# Patient Record
Sex: FEMALE | Race: WHITE
[De-identification: names, ages, dates, MRNs, and addresses within clinical notes are randomized per-mention and may not be internally consistent; named-entity substitution may affect disease eponyms.]

---

## 2017-07-27 ENCOUNTER — HOSPITAL ENCOUNTER (OUTPATIENT)
Dept: HOSPITAL 39 - GMAB | Age: 22
Discharge: HOME | End: 2017-07-27
Attending: FAMILY MEDICINE
Payer: COMMERCIAL

## 2017-07-27 DIAGNOSIS — E73.9: Primary | ICD-10-CM

## 2017-07-27 DIAGNOSIS — R53.82: ICD-10-CM

## 2017-10-23 ENCOUNTER — APPOINTMENT (RX ONLY)
Dept: URBAN - METROPOLITAN AREA CLINIC 47 | Facility: CLINIC | Age: 22
Setting detail: DERMATOLOGY
End: 2017-10-23

## 2017-10-23 DIAGNOSIS — L70.0 ACNE VULGARIS: ICD-10-CM | Status: INADEQUATELY CONTROLLED

## 2017-10-23 DIAGNOSIS — Z79.899 OTHER LONG TERM (CURRENT) DRUG THERAPY: ICD-10-CM

## 2017-10-23 PROCEDURE — ? ISOTRETINOIN INITIATION

## 2017-10-23 PROCEDURE — ? OTHER

## 2017-10-23 PROCEDURE — 99202 OFFICE O/P NEW SF 15 MIN: CPT

## 2017-10-23 PROCEDURE — ? ORDER TESTS

## 2017-10-23 PROCEDURE — ? COUNSELING

## 2017-10-23 PROCEDURE — ? URINE PREGNANCY TEST

## 2017-10-23 PROCEDURE — 81025 URINE PREGNANCY TEST: CPT

## 2017-10-23 ASSESSMENT — SEVERITY ASSESSMENT OVERALL AMONG ALL PATIENTS
IN YOUR EXPERIENCE, AMONG ALL PATIENTS YOU HAVE SEEN WITH THIS CONDITION, HOW SEVERE IS THIS PATIENT'S CONDITION?: MULTIPLE INFLAMMATORY LESIONS BUT NONINFLAMMATORY LESIONS PREDOMINATE

## 2017-10-23 NOTE — PROCEDURE: ORDER TESTS
Bill For Surgical Tray: no
Performing Laboratory: -[id number]
Expected Date Of Service: 10/23/2017
Billing Type: Third-Party Bill

## 2017-10-23 NOTE — PROCEDURE: ISOTRETINOIN INITIATION
Anticipated Starting Dosage (Optional): 40mg Daily
Ipledge Number (Optional): [id number]
Detail Level: Zone
Patient Reported Weight (Optional - Include Units): 110

## 2017-10-23 NOTE — PROCEDURE: OTHER
Other (Free Text): All options were discussed in detail\\nPatient opts for accutane
Note Text (......Xxx Chief Complaint.): This diagnosis correlates with the
Detail Level: Zone

## 2017-10-23 NOTE — PROCEDURE: URINE PREGNANCY TEST
Urine Pregnancy Test Result: negative
Expiration Date (Optional): 2018-08-31
Lot # (Optional): [id number]
Detail Level: None

## 2017-11-24 ENCOUNTER — HOSPITAL ENCOUNTER (OUTPATIENT)
Dept: HOSPITAL 39 - LAB.O | Age: 22
Discharge: HOME | End: 2017-11-24
Payer: COMMERCIAL

## 2017-11-24 DIAGNOSIS — Z79.899: Primary | ICD-10-CM

## 2017-11-27 ENCOUNTER — APPOINTMENT (RX ONLY)
Dept: URBAN - METROPOLITAN AREA CLINIC 47 | Facility: CLINIC | Age: 22
Setting detail: DERMATOLOGY
End: 2017-11-27

## 2017-11-27 DIAGNOSIS — Z79.899 OTHER LONG TERM (CURRENT) DRUG THERAPY: ICD-10-CM

## 2017-11-27 DIAGNOSIS — L70.0 ACNE VULGARIS: ICD-10-CM | Status: INADEQUATELY CONTROLLED

## 2017-11-27 PROCEDURE — 99213 OFFICE O/P EST LOW 20 MIN: CPT

## 2017-11-27 PROCEDURE — ? COUNSELING

## 2017-11-27 PROCEDURE — ? PRESCRIPTION

## 2017-11-27 PROCEDURE — ? ORDER TESTS

## 2017-11-27 PROCEDURE — ? ISOTRETINOIN INITIATION

## 2017-11-27 RX ORDER — ISOTRETINOIN 40 MG/1
CAPSULE, LIQUID FILLED ORAL
Qty: 30 | Refills: 0 | Status: ERX | COMMUNITY
Start: 2017-11-27

## 2017-11-27 RX ADMIN — ISOTRETINOIN: 40 CAPSULE, LIQUID FILLED ORAL at 17:26

## 2017-11-27 NOTE — PROCEDURE: ISOTRETINOIN INITIATION
Ipledge Number (Optional): [id number]
Anticipated Starting Dosage (Optional): 40mg Daily
Detail Level: Zone
Patient Reported Weight (Optional - Include Units): 110

## 2017-11-27 NOTE — PROCEDURE: ORDER TESTS
Billing Type: Third-Party Bill
Performing Laboratory: -[id number]
Bill For Surgical Tray: no
Expected Date Of Service: 10/23/2017

## 2017-12-25 ENCOUNTER — HOSPITAL ENCOUNTER (OUTPATIENT)
Dept: HOSPITAL 39 - LAB.O | Age: 22
Discharge: HOME | End: 2017-12-25
Attending: DERMATOLOGY
Payer: COMMERCIAL

## 2017-12-25 DIAGNOSIS — Z79.899: Primary | ICD-10-CM

## 2017-12-27 ENCOUNTER — APPOINTMENT (RX ONLY)
Dept: URBAN - METROPOLITAN AREA CLINIC 47 | Facility: CLINIC | Age: 22
Setting detail: DERMATOLOGY
End: 2017-12-27

## 2017-12-27 DIAGNOSIS — L70.0 ACNE VULGARIS: ICD-10-CM | Status: INADEQUATELY CONTROLLED

## 2017-12-27 DIAGNOSIS — Z79.899 OTHER LONG TERM (CURRENT) DRUG THERAPY: ICD-10-CM

## 2017-12-27 PROCEDURE — ? COUNSELING

## 2017-12-27 PROCEDURE — 99214 OFFICE O/P EST MOD 30 MIN: CPT

## 2017-12-27 PROCEDURE — ? ORDER TESTS

## 2017-12-27 PROCEDURE — ? PRESCRIPTION

## 2017-12-27 RX ORDER — ISOTRETINOIN 30 MG/1
CAPSULE, LIQUID FILLED ORAL
Qty: 60 | Refills: 0 | Status: ERX | COMMUNITY
Start: 2017-12-27

## 2017-12-27 RX ORDER — TRIAMCINOLONE ACETONIDE 1 MG/G
CREAM TOPICAL
Qty: 1 | Refills: 0 | Status: ERX | COMMUNITY
Start: 2017-12-27

## 2017-12-27 RX ADMIN — ISOTRETINOIN: 30 CAPSULE, LIQUID FILLED ORAL at 15:32

## 2017-12-27 RX ADMIN — TRIAMCINOLONE ACETONIDE: 1 CREAM TOPICAL at 15:52

## 2017-12-27 ASSESSMENT — SEVERITY ASSESSMENT OVERALL AMONG ALL PATIENTS
IN YOUR EXPERIENCE, AMONG ALL PATIENTS YOU HAVE SEEN WITH THIS CONDITION, HOW SEVERE IS THIS PATIENT'S CONDITION?: INFLAMMATORY LESIONS MORE APPARENT; MANY COMEDONES AND PAPULES/PUSTULES, +/- FEW NODULOCYSTIC LESIONS

## 2017-12-27 NOTE — PROCEDURE: ORDER TESTS
Bill For Surgical Tray: no
Performing Laboratory: -[id number]
Billing Type: Third-Party Bill
Expected Date Of Service: 10/23/2017

## 2018-01-22 ENCOUNTER — HOSPITAL ENCOUNTER (OUTPATIENT)
Dept: HOSPITAL 39 - LAB.O | Age: 23
Discharge: HOME | End: 2018-01-22
Attending: DERMATOLOGY
Payer: COMMERCIAL

## 2018-01-22 DIAGNOSIS — Z79.899: Primary | ICD-10-CM

## 2018-01-29 ENCOUNTER — APPOINTMENT (RX ONLY)
Dept: URBAN - METROPOLITAN AREA CLINIC 47 | Facility: CLINIC | Age: 23
Setting detail: DERMATOLOGY
End: 2018-01-29

## 2018-01-29 DIAGNOSIS — Z79.899 OTHER LONG TERM (CURRENT) DRUG THERAPY: ICD-10-CM

## 2018-01-29 DIAGNOSIS — L70.0 ACNE VULGARIS: ICD-10-CM | Status: INADEQUATELY CONTROLLED

## 2018-01-29 PROCEDURE — ? COUNSELING

## 2018-01-29 PROCEDURE — ? ISOTRETINOIN MONITORING

## 2018-01-29 PROCEDURE — ? URINE PREGNANCY TEST

## 2018-01-29 PROCEDURE — ? ORDER TESTS

## 2018-01-29 PROCEDURE — 81025 URINE PREGNANCY TEST: CPT

## 2018-01-29 PROCEDURE — 99214 OFFICE O/P EST MOD 30 MIN: CPT

## 2018-01-29 PROCEDURE — ? PRESCRIPTION

## 2018-01-29 RX ORDER — TRIAMCINOLONE ACETONIDE 1 MG/G
CREAM TOPICAL
Qty: 1 | Refills: 0 | Status: ERX

## 2018-01-29 RX ORDER — ISOTRETINOIN 30 MG/1
CAPSULE, LIQUID FILLED ORAL
Qty: 60 | Refills: 0 | Status: ERX

## 2018-01-29 NOTE — PROCEDURE: URINE PREGNANCY TEST
Lot # (Optional): [id number]
Expiration Date (Optional): 08/31/2018
Detail Level: None
Urine Pregnancy Test Result: negative

## 2018-01-29 NOTE — PROCEDURE: ORDER TESTS
Performing Laboratory: -[id number]
Expected Date Of Service: 10/23/2017
Billing Type: Third-Party Bill
Bill For Surgical Tray: no

## 2018-01-29 NOTE — PROCEDURE: ISOTRETINOIN MONITORING
Patient Weight (Optional But Required For Cumulative Dose-Numbers And Decimals Only): 110
Months Of Therapy Completed: 2
Dosing Month 2 (Required For Cumulative Dosing): 30mg BID
Are Labs Available For Review?: Yes
Dosing Month 1 (Required For Cumulative Dosing): 40mg Daily
Female Completion Statement: After discussing her treatment course we decided to discontinue isotretinoin therapy at this time. I explained that she would need to continue her birth control methods for at least one month after the last dosage. She should also get a pregnancy test one month after the last dose. She shouldn't donate blood for one month after the last dose. She should call with any new symptoms of depression.
Completed Therapy?: No
Weight Units: pounds
Pounds Preamble Statement (Weight Entered In Details Tab): Reported Weight in pounds:
Kilograms Preamble Statement (Weight Entered In Details Tab): Reported Weight in kilograms:
Ipledge Number (Optional): [id number]
Detail Level: Zone
Male Completion Statement: After discussing his treatment course we decided to discontinue isotretinoin therapy at this time. He shouldn't donate blood for one month after the last dose. He should call with any new symptoms of depression.

## 2018-02-26 ENCOUNTER — HOSPITAL ENCOUNTER (OUTPATIENT)
Dept: HOSPITAL 39 - LAB.O | Age: 23
End: 2018-02-26
Attending: DERMATOLOGY
Payer: COMMERCIAL

## 2018-02-26 DIAGNOSIS — Z79.899: Primary | ICD-10-CM

## 2018-02-27 ENCOUNTER — HOSPITAL ENCOUNTER (OUTPATIENT)
Dept: HOSPITAL 39 - GMAB | Age: 23
End: 2018-02-27
Attending: FAMILY MEDICINE
Payer: COMMERCIAL

## 2018-02-27 DIAGNOSIS — M54.5: Primary | ICD-10-CM

## 2018-02-28 ENCOUNTER — RX ONLY (OUTPATIENT)
Age: 23
Setting detail: RX ONLY
End: 2018-02-28

## 2018-02-28 ENCOUNTER — APPOINTMENT (RX ONLY)
Dept: URBAN - METROPOLITAN AREA CLINIC 47 | Facility: CLINIC | Age: 23
Setting detail: DERMATOLOGY
End: 2018-02-28

## 2018-02-28 DIAGNOSIS — Z79.899 OTHER LONG TERM (CURRENT) DRUG THERAPY: ICD-10-CM | Status: STABLE

## 2018-02-28 DIAGNOSIS — L70.0 ACNE VULGARIS: ICD-10-CM | Status: IMPROVED

## 2018-02-28 PROCEDURE — ? URINE PREGNANCY TEST

## 2018-02-28 PROCEDURE — 81025 URINE PREGNANCY TEST: CPT

## 2018-02-28 PROCEDURE — ? OTHER

## 2018-02-28 PROCEDURE — 99214 OFFICE O/P EST MOD 30 MIN: CPT

## 2018-02-28 PROCEDURE — ? ISOTRETINOIN MONITORING

## 2018-02-28 PROCEDURE — ? ORDER TESTS

## 2018-02-28 PROCEDURE — ? COUNSELING

## 2018-02-28 PROCEDURE — ? TREATMENT REGIMEN

## 2018-02-28 RX ORDER — ISOTRETINOIN 30 MG/1
CAPSULE, LIQUID FILLED ORAL
Qty: 60 | Refills: 0 | Status: ERX

## 2018-02-28 ASSESSMENT — LOCATION ZONE DERM: LOCATION ZONE: FACE

## 2018-02-28 ASSESSMENT — LOCATION DETAILED DESCRIPTION DERM
LOCATION DETAILED: LEFT INFERIOR MEDIAL MALAR CHEEK
LOCATION DETAILED: LEFT INFERIOR CENTRAL MALAR CHEEK

## 2018-02-28 ASSESSMENT — LOCATION SIMPLE DESCRIPTION DERM: LOCATION SIMPLE: LEFT CHEEK

## 2018-02-28 NOTE — PROCEDURE: ISOTRETINOIN MONITORING
Weight Units: pounds
Patient Weight (Optional But Required For Cumulative Dose-Numbers And Decimals Only): 110
Dosing Month 2 (Required For Cumulative Dosing): 30mg BID
Dosing Month 1 (Required For Cumulative Dosing): 40mg Daily
Female Completion Statement: After discussing her treatment course we decided to discontinue isotretinoin therapy at this time. I explained that she would need to continue her birth control methods for at least one month after the last dosage. She should also get a pregnancy test one month after the last dose. She shouldn't donate blood for one month after the last dose. She should call with any new symptoms of depression.
Detail Level: Zone
Ipledge Number (Optional): [id number]
Display Individual Monthly Dosage In The Note (If Yes Will Display All Dosages Which Are Not N/A): yes
Pounds Preamble Statement (Weight Entered In Details Tab): Reported Weight in pounds:
Kilograms Preamble Statement (Weight Entered In Details Tab): Reported Weight in kilograms:
Completed Therapy?: No
Months Of Therapy Completed: 3
Male Completion Statement: After discussing his treatment course we decided to discontinue isotretinoin therapy at this time. He shouldn't donate blood for one month after the last dose. He should call with any new symptoms of depression.

## 2018-02-28 NOTE — PROCEDURE: TREATMENT REGIMEN
Continue Regimen: Micheline is 30mg bid
Plan: Due to dryness, alternate medicine. Take 30mg one day and 30mg bid the next
Detail Level: Zone

## 2018-02-28 NOTE — PROCEDURE: OTHER
Detail Level: Zone
Other (Free Text): Pt states that she started a muscle relaxer yesterday from her pcp and states that between this and the isotretinoin she is very drowsy. Advised pt that there is no good solution for this. Try caffeine until finished medicine
Note Text (......Xxx Chief Complaint.): This diagnosis correlates with the

## 2018-03-29 ENCOUNTER — APPOINTMENT (RX ONLY)
Dept: URBAN - METROPOLITAN AREA CLINIC 47 | Facility: CLINIC | Age: 23
Setting detail: DERMATOLOGY
End: 2018-03-29

## 2018-03-29 DIAGNOSIS — L70.0 ACNE VULGARIS: ICD-10-CM | Status: IMPROVED

## 2018-03-29 DIAGNOSIS — Z79.899 OTHER LONG TERM (CURRENT) DRUG THERAPY: ICD-10-CM

## 2018-03-29 PROCEDURE — ? TREATMENT REGIMEN

## 2018-03-29 PROCEDURE — ? COUNSELING

## 2018-03-29 PROCEDURE — ? PRESCRIPTION

## 2018-03-29 PROCEDURE — 99213 OFFICE O/P EST LOW 20 MIN: CPT

## 2018-03-29 PROCEDURE — ? ISOTRETINOIN MONITORING

## 2018-03-29 RX ORDER — ADAPALENE 3 MG/G
GEL TOPICAL QHS
Qty: 1 | Refills: 2 | Status: ERX | COMMUNITY
Start: 2018-03-29

## 2018-03-29 RX ADMIN — ADAPALENE: 3 GEL TOPICAL at 13:26

## 2018-03-29 ASSESSMENT — LOCATION ZONE DERM: LOCATION ZONE: FACE

## 2018-03-29 ASSESSMENT — LOCATION DETAILED DESCRIPTION DERM
LOCATION DETAILED: LEFT INFERIOR CENTRAL MALAR CHEEK
LOCATION DETAILED: LEFT INFERIOR MEDIAL MALAR CHEEK

## 2018-03-29 ASSESSMENT — SEVERITY ASSESSMENT OVERALL AMONG ALL PATIENTS
IN YOUR EXPERIENCE, AMONG ALL PATIENTS YOU HAVE SEEN WITH THIS CONDITION, HOW SEVERE IS THIS PATIENT'S CONDITION?: ALMOST CLEAR

## 2018-03-29 ASSESSMENT — LOCATION SIMPLE DESCRIPTION DERM: LOCATION SIMPLE: LEFT CHEEK

## 2018-03-29 NOTE — PROCEDURE: COUNSELING
Doxycycline Pregnancy And Lactation Text: This medication is Pregnancy Category D and not consider safe during pregnancy. It is also excreted in breast milk but is considered safe for shorter treatment courses.
Minocycline Pregnancy And Lactation Text: This medication is Pregnancy Category D and not consider safe during pregnancy. It is also excreted in breast milk.
Azithromycin Pregnancy And Lactation Text: This medication is considered safe during pregnancy and is also secreted in breast milk.
Minocycline Counseling: Patient advised regarding possible photosensitivity and discoloration of the teeth, skin, lips, tongue and gums.  Patient instructed to avoid sunlight, if possible.  When exposed to sunlight, patients should wear protective clothing, sunglasses, and sunscreen.  The patient was instructed to call the office immediately if the following severe adverse effects occur:  hearing changes, easy bruising/bleeding, severe headache, or vision changes.  The patient verbalized understanding of the proper use and possible adverse effects of minocycline.  All of the patient's questions and concerns were addressed.
Birth Control Pills Pregnancy And Lactation Text: This medication should be avoided if pregnant and for the first 30 days post-partum.
Dapsone Pregnancy And Lactation Text: This medication is Pregnancy Category C and is not considered safe during pregnancy or breast feeding.
Erythromycin Pregnancy And Lactation Text: This medication is Pregnancy Category B and is considered safe during pregnancy. It is also excreted in breast milk.
Doxycycline Counseling:  Patient counseled regarding possible photosensitivity and increased risk for sunburn.  Patient instructed to avoid sunlight, if possible.  When exposed to sunlight, patients should wear protective clothing, sunglasses, and sunscreen.  The patient was instructed to call the office immediately if the following severe adverse effects occur:  hearing changes, easy bruising/bleeding, severe headache, or vision changes.  The patient verbalized understanding of the proper use and possible adverse effects of doxycycline.  All of the patient's questions and concerns were addressed.
Azithromycin Counseling:  I discussed with the patient the risks of azithromycin including but not limited to GI upset, allergic reaction, drug rash, diarrhea, and yeast infections.
Tazorac Counseling:  Patient advised that medication is irritating and drying.  Patient may need to apply sparingly and wash off after an hour before eventually leaving it on overnight.  The patient verbalized understanding of the proper use and possible adverse effects of tazorac.  All of the patient's questions and concerns were addressed.
Isotretinoin Counseling: Patient should get monthly blood tests, not donate blood, not drive at night if vision affected, not share medication, and not undergo elective surgery for 6 months after tx completed. Side effects reviewed, pt to contact office should one occur.
Tetracycline Counseling: Patient counseled regarding possible photosensitivity and increased risk for sunburn.  Patient instructed to avoid sunlight, if possible.  When exposed to sunlight, patients should wear protective clothing, sunglasses, and sunscreen.  The patient was instructed to call the office immediately if the following severe adverse effects occur:  hearing changes, easy bruising/bleeding, severe headache, or vision changes.  The patient verbalized understanding of the proper use and possible adverse effects of tetracycline.  All of the patient's questions and concerns were addressed. Patient understands to avoid pregnancy while on therapy due to potential birth defects.
Benzoyl Peroxide Counseling: Patient counseled that medicine may cause skin irritation and bleach clothing.  In the event of skin irritation, the patient was advised to reduce the amount of the drug applied or use it less frequently.   The patient verbalized understanding of the proper use and possible adverse effects of benzoyl peroxide.  All of the patient's questions and concerns were addressed.
Bactrim Counseling:  I discussed with the patient the risks of sulfa antibiotics including but not limited to GI upset, allergic reaction, drug rash, diarrhea, dizziness, photosensitivity, and yeast infections.  Rarely, more serious reactions can occur including but not limited to aplastic anemia, agranulocytosis, methemoglobinemia, blood dyscrasias, liver or kidney failure, lung infiltrates or desquamative/blistering drug rashes.
Erythromycin Counseling:  I discussed with the patient the risks of erythromycin including but not limited to GI upset, allergic reaction, drug rash, diarrhea, increase in liver enzymes, and yeast infections.
Topical Retinoid Pregnancy And Lactation Text: This medication is Pregnancy Category C. It is unknown if this medication is excreted in breast milk.
Topical Sulfur Applications Counseling: Topical Sulfur Counseling: Patient counseled that this medication may cause skin irritation or allergic reactions.  In the event of skin irritation, the patient was advised to reduce the amount of the drug applied or use it less frequently.   The patient verbalized understanding of the proper use and possible adverse effects of topical sulfur application.  All of the patient's questions and concerns were addressed.
Bactrim Pregnancy And Lactation Text: This medication is Pregnancy Category D and is known to cause fetal risk.  It is also excreted in breast milk.
High Dose Vitamin A Pregnancy And Lactation Text: High dose vitamin A therapy is contraindicated during pregnancy and breast feeding.
Topical Clindamycin Counseling: Patient counseled that this medication may cause skin irritation or allergic reactions.  In the event of skin irritation, the patient was advised to reduce the amount of the drug applied or use it less frequently.   The patient verbalized understanding of the proper use and possible adverse effects of clindamycin.  All of the patient's questions and concerns were addressed.
Topical Clindamycin Pregnancy And Lactation Text: This medication is Pregnancy Category B and is considered safe during pregnancy. It is unknown if it is excreted in breast milk.
Dapsone Counseling: I discussed with the patient the risks of dapsone including but not limited to hemolytic anemia, agranulocytosis, rashes, methemoglobinemia, kidney failure, peripheral neuropathy, headaches, GI upset, and liver toxicity.  Patients who start dapsone require monitoring including baseline LFTs and weekly CBCs for the first month, then every month thereafter.  The patient verbalized understanding of the proper use and possible adverse effects of dapsone.  All of the patient's questions and concerns were addressed.
Include Pregnancy/Lactation Warning?: No
High Dose Vitamin A Counseling: Side effects reviewed, pt to contact office should one occur.
Tazorac Pregnancy And Lactation Text: This medication is not safe during pregnancy. It is unknown if this medication is excreted in breast milk.
Isotretinoin Pregnancy And Lactation Text: This medication is Pregnancy Category X and is considered extremely dangerous during pregnancy. It is unknown if it is excreted in breast milk.
Benzoyl Peroxide Pregnancy And Lactation Text: This medication is Pregnancy Category C. It is unknown if benzoyl peroxide is excreted in breast milk.
Topical Sulfur Applications Pregnancy And Lactation Text: This medication is Pregnancy Category C and has an unknown safety profile during pregnancy. It is unknown if this topical medication is excreted in breast milk.
Topical Retinoid counseling:  Patient advised to apply a pea-sized amount only at bedtime and wait 30 minutes after washing their face before applying.  If too drying, patient may add a non-comedogenic moisturizer. The patient verbalized understanding of the proper use and possible adverse effects of retinoids.  All of the patient's questions and concerns were addressed.
Detail Level: Zone
Birth Control Pills Counseling: Birth Control Pill Counseling: I discussed with the patient the potential side effects of OCPs including but not limited to increased risk of stroke, heart attack, thrombophlebitis, deep venous thrombosis, hepatic adenomas, breast changes, GI upset, headaches, and depression.  The patient verbalized understanding of the proper use and possible adverse effects of OCPs. All of the patient's questions and concerns were addressed.
Spironolactone Pregnancy And Lactation Text: This medication can cause feminization of the male fetus and should be avoided during pregnancy. The active metabolite is also found in breast milk.
Spironolactone Counseling: Patient advised regarding risks of diarrhea, abdominal pain, hyperkalemia, birth defects (for female patients), liver toxicity and renal toxicity. The patient may need blood work to monitor liver and kidney function and potassium levels while on therapy. The patient verbalized understanding of the proper use and possible adverse effects of spironolactone.  All of the patient's questions and concerns were addressed.

## 2018-03-29 NOTE — HPI: MEDICATION (ACCUTANE)
How Severe Is It?: mild
Is This A New Presentation, Or A Follow-Up?: Follow Up Accutane
Display Cumulative Dose In The Note?: Yes
When Was Accutane Started?: 11/2018

## 2018-03-29 NOTE — PROCEDURE: ISOTRETINOIN MONITORING
Are Labs Available For Review?: Yes
Months Of Therapy Completed: 4
Kilograms Preamble Statement (Weight Entered In Details Tab): Reported Weight in kilograms:
Dosing Month 3 (Required For Cumulative Dosing): 30mg BID
Detail Level: Zone
Pounds Preamble Statement (Weight Entered In Details Tab): Reported Weight in pounds:
Dosing Month 1 (Required For Cumulative Dosing): 40mg Daily
Completed Therapy?: No
Patient Weight (Optional But Required For Cumulative Dose-Numbers And Decimals Only): 110
Female Completion Statement: After discussing her treatment course we decided to discontinue isotretinoin therapy at this time. I explained that she would need to continue her birth control methods for at least one month after the last dosage. She should also get a pregnancy test one month after the last dose. She shouldn't donate blood for one month after the last dose. She should call with any new symptoms of depression.
Male Completion Statement: After discussing his treatment course we decided to discontinue isotretinoin therapy at this time. He shouldn't donate blood for one month after the last dose. He should call with any new symptoms of depression.
Dosing Month 5 (Required For Cumulative Dosing): Discontinue
Ipledge Number (Optional): [id number]
Weight Units: pounds

## 2019-04-11 ENCOUNTER — HOSPITAL ENCOUNTER (EMERGENCY)
Dept: HOSPITAL 39 - ER | Age: 24
Discharge: HOME | End: 2019-04-11
Payer: COMMERCIAL

## 2019-04-11 VITALS — TEMPERATURE: 98.2 F | SYSTOLIC BLOOD PRESSURE: 102 MMHG | DIASTOLIC BLOOD PRESSURE: 56 MMHG | OXYGEN SATURATION: 97 %

## 2019-04-11 DIAGNOSIS — N94.6: Primary | ICD-10-CM

## 2019-04-11 DIAGNOSIS — R55: ICD-10-CM

## 2019-04-11 DIAGNOSIS — Z87.891: ICD-10-CM

## 2019-04-11 DIAGNOSIS — E86.0: ICD-10-CM

## 2019-04-11 PROCEDURE — 81025 URINE PREGNANCY TEST: CPT

## 2019-04-11 PROCEDURE — 81001 URINALYSIS AUTO W/SCOPE: CPT

## 2019-04-11 NOTE — ED.PDOC
History of Present Illness





- General


Chief Complaint: OB/GYN Problem


Stated Complaint: dizzy, cramping, nausea


Time Seen by Provider: 04/11/19 20:33


Source: patient


Exam Limitations: no limitations





- History of Present Illness


Initial Comments: 





The patient is a 24-year-old  female presenting to the emergency room 

after having had a near syncopal episode due to abdominal cramps related to her 

periods. she does have a history of significant dysmenorrhea as well.  sHe has 

gotten a little worse last few months that she has come off her Depo-Provera.  

No history of heavy bleeding.  menstrual flow was not abnormally high today. No 

significant anemia history. She was feeling fine prior to this.  It has been 

suspected in the past that she may have endometriosis.  The patient is pleasant 

and cooperative though she is obviously in discomfort.  Blood pressures running 

normal low for her.


Timing/Duration: momentarily


Severity: moderate


Improving Factors: nothing


Worsening Factors: nothing


Associated Symptoms: denies symptoms


Allergies/Adverse Reactions: 


Allergies





NO KNOWN ALLERGY Allergy (Verified 04/11/19 21:51)


   





Home Medications: 


Ambulatory Orders





Acetaminophen-Caff-Butalbital [Fioricet] 1 ea PO Q8H PRN #10 tab 04/11/19 











Review of Systems





- Review of Systems


Constitutional: States: no symptoms reported


EENTM: States: no symptoms reported


Respiratory: States: no symptoms reported


Cardiology: States: no symptoms reported


Gastrointestinal/Abdominal: States: abdominal pain


Genitourinary: States: see HPI


Musculoskeletal: States: no symptoms reported


Skin: States: no symptoms reported


Neurological: States: other - near syncope


Endocrine: States: no symptoms reported


All other Systems: No Change from Baseline





Past Medical History (General)





- Patient Medical History


Hx Seizures: No


Hx Stroke: No


Hx Dementia: No


Hx Asthma: No


Hx of COPD: No


Hx Cardiac Disorders: No


Hx Congestive Heart Failure: No


Hx Pacemaker: No


Hx Hypertension: No


Hx Thyroid Disease: No


Hx Diabetes: No


Hx Gastroesophageal Reflux: No


Hx Renal Disease: No


Hx Cancer: No


Hx of HIV: No


Hx Hepatitis C: No


Hx MRSA: No


Surgical History: appendectomy





- Vaccination History


Hx Tetanus, Diphtheria Vaccination: No


Hx Influenza Vaccination: No


Hx Pneumococcal Vaccination: No


Immunizations Up to Date: No





- Social History


Hx Tobacco Use: Yes


Hx Alcohol Use: Yes





- Female History


Patient is a Female of Child Bearing Age (10 -59 yrs old): Yes


Hx Last Menstrual Period: 04/09/19


Patient Pregnant: No





Family Medical History





- Family History


  ** Mother


Family History: Unknown





Physical Exam





- Physical Exam


General Appearance: Alert, Other - ncomfortable


Eye Exam: bilateral normal


Ears, Nose, Throat: hearing grossly normal, normal pharynx


Neck: full range of motion, supple


Respiratory: lungs clear, normal breath sounds, no respiratory distress, no 

accessory muscle use


Cardiovascular/Chest: normal peripheral pulses, regular rate, rhythm, no edema


Peripheral Pulses: radial,right: 2+, radial,left: 2+


Gastrointestinal/Abdominal: soft, other - mild lower abdominal discomfort 

palpation.  No rebound or peritoneal signs.


Rectal Exam: deferred


Back Exam: no CVA tenderness, no vertebral tenderness


Extremity: non-tender, normal inspection, no pedal edema, normal capillary 

refill


Neurologic: CNs II-XII nml as tested, alert, normal mood/affect, oriented x 3


Skin Exam: normal color


Comments: 





                               Vital Signs - 24 hr











  04/11/19 04/11/19 04/11/19





  20:20 21:15 22:05


 


Temperature 98.1 F  98.1 F


 


Pulse Rate [ 60 78 64





left]   


 


Respiratory 20 20 20





Rate   


 


Blood Pressure 107/66 90/59 92/55





[left]   


 


O2 Sat by Pulse 99 100 100





Oximetry   














Progress





- Progress


Progress: 





04/11/19 22:24


the patient's a 24-year-old female presenting to the emergency room secondary to

 severe dysmenorrhea and a near syncopal episode associated with it.  The 

patient does have some mild dehydration and does need to increase her fluid 

intake to help prevent recurrence.  Additionally she should talk with her 

gynecologist about an evaluation for possible endometriosis and additional 

hormonal control to reduce dysmenorrhea.  For now the patient will be written 

for a short prescription of Fioricet for as needed use.  ER warnings were given.

  follow up with PCP or gynecologist in the coming weeks.the patient is feeling 

significantly better at this time.


04/11/19 22:27








- Results/Orders


Results/Orders: 





                                Laboratory Tests











  04/11/19 04/11/19





  21:40 21:40


 


Urine Color   Dk yellow H


 


Urine Appearance   Sl cloudy


 


Urine pH   7.0


 


Ur Specific Gravity   1.020


 


Urine Protein   30


 


Urine Glucose (UA)   Negative


 


Urine Ketones   15 H


 


Urine Blood   Large H


 


Urine Nitrite   Negative


 


Urine Bilirubin   Negative


 


Urine Urobilinogen   0.2


 


Ur Leukocyte Esterase   Negative


 


Urine RBC   20-30 H


 


Urine WBC   1-3


 


Ur Epithelial Cells   1-3


 


Urine Bacteria   1+


 


Urine Mucus   Moderate


 


Urine HCG, Qual  Negative 














Departure





- Departure


Clinical Impression: 


 Dysmenorrhea, Near syncope, Mild dehydration





Disposition: Discharge to Home or Self Care


Condition: Fair


Departure Forms:  ED Discharge - Pt. Copy, Patient Portal Self Enrollment


Instructions:  Menstrual Cramps (DC), Near Fainting (DC)


Diet: regular diet - Increase fluid intake


Activity: increase activity as tolerated


Referrals: 


MEAGAN VINCENT MD [Primary Care Provider] - 1-2 Weeks


Prescriptions: 


Acetaminophen-Caff-Butalbital [Fioricet] 1 ea PO Q8H PRN #10 tab


 PRN Reason: Pain


Home Medications: 


Ambulatory Orders





Acetaminophen-Caff-Butalbital [Fioricet] 1 ea PO Q8H PRN #10 tab 04/11/19 








Additional Instructions: 


the patient's a 24-year-old female presenting to the emergency room secondary to

 severe dysmenorrhea and a near syncopal episode associated with it.  The 

patient does have some mild dehydration and does need to increase her fluid inta

ke to help prevent recurrence.  Additionally she should talk with her 

gynecologist about an evaluation for possible endometriosis and additional 

hormonal control to reduce dysmenorrhea.  the patient is feeling better at this 

time.  For now the patient will be written for a short prescription of Fioricet 

for as needed use.  ER warnings were given.  follow up with PCP or gynecologist 

in the coming weeks.

## 2020-08-25 ENCOUNTER — HOSPITAL ENCOUNTER (OUTPATIENT)
Dept: HOSPITAL 39 - GMAE | Age: 25
Discharge: HOME | End: 2020-08-25
Attending: FAMILY MEDICINE
Payer: COMMERCIAL

## 2020-08-25 DIAGNOSIS — N39.0: Primary | ICD-10-CM

## 2020-08-26 ENCOUNTER — HOSPITAL ENCOUNTER (OUTPATIENT)
Dept: HOSPITAL 39 - GMAE | Age: 25
Discharge: HOME | End: 2020-08-26
Attending: FAMILY MEDICINE
Payer: COMMERCIAL

## 2020-08-26 DIAGNOSIS — R70.0: Primary | ICD-10-CM

## 2020-10-06 ENCOUNTER — HOSPITAL ENCOUNTER (EMERGENCY)
Dept: HOSPITAL 39 - ER | Age: 25
Discharge: HOME | End: 2020-10-06
Payer: COMMERCIAL

## 2020-10-06 VITALS — SYSTOLIC BLOOD PRESSURE: 107 MMHG | OXYGEN SATURATION: 98 % | DIASTOLIC BLOOD PRESSURE: 66 MMHG

## 2020-10-06 VITALS — TEMPERATURE: 97.6 F

## 2020-10-06 DIAGNOSIS — E86.0: ICD-10-CM

## 2020-10-06 DIAGNOSIS — A08.4: Primary | ICD-10-CM

## 2020-10-06 DIAGNOSIS — D72.828: ICD-10-CM

## 2020-10-06 DIAGNOSIS — K21.9: ICD-10-CM

## 2020-10-06 PROCEDURE — 82150 ASSAY OF AMYLASE: CPT

## 2020-10-06 PROCEDURE — 36415 COLL VENOUS BLD VENIPUNCTURE: CPT

## 2020-10-06 PROCEDURE — 85025 COMPLETE CBC W/AUTO DIFF WBC: CPT

## 2020-10-06 PROCEDURE — 80053 COMPREHEN METABOLIC PANEL: CPT

## 2020-10-06 PROCEDURE — 83690 ASSAY OF LIPASE: CPT

## 2020-10-06 NOTE — ED.PDOC
History of Present Illness





- General


Chief Complaint: GI Problem


Stated Complaint: vomiting


Time Seen by Provider: 10/06/20 18:33


Information Source: patient


Exam Limitations: no limitations





- History of Present Illness


Initial Comments: 


C/O N/V, STARTING THIS MORNING.  PT HAD ENDOSCOPY (EGD) AT 11 AM TODAY FOR H/O 

GERD (TAKES OMEPRAZOLE).  N/V STARTED THIS MORNING PRIOR TO THE EGD, SO IT IS 

NOT A RESULT OF THE EGD; RATHER IS COINCIDENTAL TIMING.  


SHE DENIES ANY ABDOMINAL PAIN.    


Abdominal Pain Onset Location: other - NO PAIN. 


Pain Radiation: no radiation


Worsening Factors: eating


Associated Symptoms: nausea/vomiting





Review of Systems





- Review of Systems


Constitutional: Denies: chills, fever


EENTM: States: no symptoms reported


Respiratory: States: no symptoms reported


Cardiology: States: no symptoms reported


Gastrointestinal/Abdominal: States: nausea, vomiting.  Denies: abdominal pain, 

constipation, diarrhea


Genitourinary: States: no symptoms reported


Musculoskeletal: States: no symptoms reported


Skin: States: no symptoms reported


Neurological: States: no symptoms reported


Endocrine: States: no symptoms reported


Hematologic/Lymphatic: States: no symptoms reported


All other Systems: Reviewed and Negative





Past Medical History (General)





- Patient Medical History


Hx Seizures: No


Hx Stroke: No


Hx Dementia: No


Hx Asthma: No


Hx of COPD: No


Hx Cardiac Disorders: No


Hx Congestive Heart Failure: No


Hx Pacemaker: No


Hx Hypertension: No


Hx Thyroid Disease: No


Hx Diabetes: No


Hx Gastroesophageal Reflux: Yes


Hx Renal Disease: No


Hx Cancer: No


Hx of HIV: No


Hx Hepatitis C: No


Hx MRSA: No


Surgical History: appendectomy





- Vaccination History


Hx Tetanus, Diphtheria Vaccination: No


Hx Influenza Vaccination: Yes - 2019


Hx Pneumococcal Vaccination: No





- Social History


Hx Tobacco Use: No


Hx Alcohol Use: Yes





- Female History


Patient is a Female of Child Bearing Age (10 -59 yrs old): Yes


Hx Last Menstrual Period: 04/09/19


Patient Pregnant: No





Family Medical History





- Family History


  ** Mother


Family History: Unknown





Physical Exam





- Physical Exam


General Appearance: Alert, Obvious distress


Eyes, Ears, Nose, Throat Exam: PERRL/EOMI, normal ENT inspection


Neck: full range of motion, normal inspection


Respiratory: lungs clear, no respiratory distress


Cardiovascular/Chest: regular rate, rhythm, no murmur


Peripheral Pulses: No deficit


Gastrointestinal/Abdominal: soft, no organomegaly, no pulsatile mass, other - 

MILDLY TTP IN EPIGASTRIC; MORE OF A CRAMPING THAN  AMARJIT PAIN.  NO G/R.  NOT A 

SURGICAL ABDOMEN.  


Rectal Exam: deferred


Back Exam: normal inspection, no CVA tenderness


Extremity: normal range of motion, normal inspection


Neurologic: no motor/sensory deficits, alert, normal mood/affect


Skin Exam: normal color, warm/dry


Lymphatic: no adenopathy





Progress





- Results/Orders


Results/Orders: 





CBC ELEV WBC (12) AND NEUTS. 


CMP NONCONTRIBUTORY. 


LIPASE/AMYLASE NEG. 





N/V (NO ABD PAIN), NEUTROPHILIC LEUKOCYTOSIS.  DX ACUTE VIRAL GASTROENTERITIS X 

1 DAY (PT FELT NL YESTERDAY).  FOOD POISIONING ALSO ON THE DDX.  


MILD DEHYDRATION FROM THE EMESIS.  GAVE 1L NS BOLUS.





OUTPT SUPPORTIVE CARE VIA ANTIEMETICS AND TINCTURE OF TIME.  PT TOOK AN 

ANTIEMETIC AT HOME (PT UNSURE WHICH ONE), WHICH DIDN'T HELP.  ZOFRAN IV IN THE 

ER DID NOT HELP BUT THE PHENERGAN DID, THUS I WILL RX PHENERGAN ELIXIR.    





SAFE FOR DC TO HOME.  F/U WITH GI DR. RE: EGD RESULTS AND THE N/V IF NOT 

IMPROVING.   





Departure





- Departure


Clinical Impression: 


 Viral gastroenteritis, Neutrophilic leukocytosis, Mild dehydration





Nausea & vomiting


Qualifiers:


 Vomiting type: unspecified Vomiting Intractability: non-intractable Qualified 

Code(s): R11.2 - Nausea with vomiting, unspecified





Disposition: Discharge to Home or Self Care


Condition: Good


Departure Forms:  ED Discharge - Pt. Copy, Patient Portal Self Enrollment


Instructions:  Viral Gastroenteritis, Adult (DC)


Diet: bland diet


Activity: increase activity as tolerated


Referrals: 


MEAGAN VINCENT MD [Primary Care Provider] - 1-2 Weeks


Prescriptions: 


Promethazine Syr 6.25 mg/5 ml [Phenergan Syrup 6.25 mg/5 ml] 10 ml PO Q6HR PRN 

#200 ml


 PRN Reason: Nausea


Home Medications: 


Ambulatory Orders





Omeprazole 40 mg PO DAILY 10/06/20 


Promethazine Syr 6.25 mg/5 ml [Phenergan Syrup 6.25 mg/5 ml] 10 ml PO Q6HR PRN 

#200 ml 10/06/20 








Additional Instructions: 


Please see your GI doctor regarding the EGD endoscopy results and for the nausea

and vomiting if it doesn't start improving over this week.  Please return to the

ER if your symptoms worsen.

## 2020-12-08 ENCOUNTER — HOSPITAL ENCOUNTER (EMERGENCY)
Dept: HOSPITAL 39 - ER | Age: 25
Discharge: HOME | End: 2020-12-08
Payer: COMMERCIAL

## 2020-12-08 VITALS — DIASTOLIC BLOOD PRESSURE: 50 MMHG | OXYGEN SATURATION: 98 % | SYSTOLIC BLOOD PRESSURE: 88 MMHG | TEMPERATURE: 97.9 F

## 2020-12-08 DIAGNOSIS — R11.2: Primary | ICD-10-CM

## 2020-12-08 DIAGNOSIS — Z20.828: ICD-10-CM

## 2020-12-08 DIAGNOSIS — F12.90: ICD-10-CM

## 2020-12-08 DIAGNOSIS — Z90.49: ICD-10-CM

## 2020-12-08 DIAGNOSIS — K21.9: ICD-10-CM

## 2020-12-08 DIAGNOSIS — Z87.891: ICD-10-CM

## 2020-12-08 PROCEDURE — 83690 ASSAY OF LIPASE: CPT

## 2020-12-08 PROCEDURE — 87880 STREP A ASSAY W/OPTIC: CPT

## 2020-12-08 PROCEDURE — 84703 CHORIONIC GONADOTROPIN ASSAY: CPT

## 2020-12-08 PROCEDURE — 80053 COMPREHEN METABOLIC PANEL: CPT

## 2020-12-08 PROCEDURE — 85025 COMPLETE CBC W/AUTO DIFF WBC: CPT

## 2020-12-08 PROCEDURE — 80307 DRUG TEST PRSMV CHEM ANLYZR: CPT

## 2020-12-08 PROCEDURE — 81001 URINALYSIS AUTO W/SCOPE: CPT

## 2020-12-08 PROCEDURE — 87635 SARS-COV-2 COVID-19 AMP PRB: CPT

## 2020-12-08 PROCEDURE — 87502 INFLUENZA DNA AMP PROBE: CPT

## 2020-12-08 NOTE — ED.PDOC
History of Present Illness





- General


Chief Complaint: GI Problem


Stated Complaint: nausea bodyaches


Time Seen by Provider: 12/08/20 00:33





- History of Present Illness


Initial Comments: 


PATIENT PRESENTS WITH NAUSEA, DIFFUSE MYALGIAS, ABD PAIN, SYMPTOMS ONSET X 1 

DAY. DENIES F/C. NO IMPROVEMENT WITH PHENERGAN, SHE HAS HX OF ENDOMETRIOSIS AND 

HAS HAD SIMILAR SYMPTOMS AS A RESULT OF IT, ONSET OF MENSES YESTERDAY COINCIDED 

WITH ONSET OF SYMPTOMS. 


Abdominal Pain Onset Location: suprapubic


Pain Radiation: no radiation


Quality: severe, cramping


Improving Factors: nothing


Associated Symptoms: nausea/vomiting





Review of Systems





- Review of Systems


Constitutional: States: no symptoms reported


EENTM: States: no symptoms reported.  Denies: throat pain


Respiratory: States: no symptoms reported


Cardiology: States: no symptoms reported


Gastrointestinal/Abdominal: States: abdominal pain, nausea, vomiting


Musculoskeletal: States: no symptoms reported


Skin: States: no symptoms reported


Neurological: States: no symptoms reported


Endocrine: States: no symptoms reported





Past Medical History (General)





- Patient Medical History


Hx Seizures: No


Hx Stroke: No


Hx Dementia: No


Hx Asthma: No


Hx of COPD: No


Hx Cardiac Disorders: No


Hx Congestive Heart Failure: No


Hx Pacemaker: No


Hx Hypertension: No


Hx Thyroid Disease: No


Hx Diabetes: No


Hx Gastroesophageal Reflux: Yes


Hx Renal Disease: No


Hx Cancer: No


Hx of HIV: No


Hx Hepatitis C: No


Hx MRSA: No


Surgical History: appendectomy





- Vaccination History


Hx Tetanus, Diphtheria Vaccination: No


Hx Influenza Vaccination: No


Hx Pneumococcal Vaccination: No





- Social History


Hx Tobacco Use: Yes - vapes


Hx Alcohol Use: Yes - occ





- Female History


Hx Last Menstrual Period: 12/07/20


Patient Pregnant: No





Family Medical History





- Family History


  ** Mother


Family History: Unknown





Physical Exam





- Physical Exam


General Appearance: Alert, Anxious, Ill Appearing, Restless, Well Developed, 

Well Groomed, Well Hydrated, Well Nourished


Eyes, Ears, Nose, Throat Exam: PERRL/EOMI


Neck: non-tender, full range of motion, supple, normal inspection


Respiratory: chest non-tender, lungs clear, normal breath sounds, no respiratory

distress


Cardiovascular/Chest: normal peripheral pulses, regular rate, rhythm, no edema, 

no gallop


Gastrointestinal/Abdominal: normal bowel sounds, soft, tenderness - MILD DIFFUSE


Extremity: normal range of motion, non-tender, normal inspection


Neurologic: no motor/sensory deficits, alert, normal mood/affect, oriented x 3


Skin Exam: normal color, warm/dry





Progress





- Progress


Progress: 





12/08/20 02:10


PATIENT WITH NO SYMPTOMS TO SUGGEST STREP. SUSPECT CHRONIC CARRIER. NO SORE 

THROAT, NO FINDINGS IN PHARYNX ON ORAL EXAM. NO FEVER. NO HA. 





Departure





- Departure


Clinical Impression: 


Vomiting


Qualifiers:


 Vomiting type: unspecified Vomiting Intractability: unspecified Nausea 

presence: with nausea Qualified Code(s): R11.2 - Nausea with vomiting, 

unspecified





Nausea & vomiting


Qualifiers:


 Vomiting type: unspecified Vomiting Intractability: unspecified Qualified 

Code(s): R11.2 - Nausea with vomiting, unspecified





Disposition: Discharge to Home or Self Care


Condition: Good


Departure Forms:  ED Discharge - Pt. Copy, Patient Portal Self Enrollment


Referrals: 


MEAGAN VINCENT MD [Primary Care Provider] - 1-2 Weeks


Prescriptions: 


Metoclopramide HCl [Reglan] 10 mg PO Q6H PRN #20 tab


 PRN Reason: Nausea


Home Medications: 


Ambulatory Orders





Omeprazole 40 mg PO DAILY 10/06/20 


Promethazine Syr 6.25 mg/5 ml [Phenergan Syrup 6.25 mg/5 ml] 10 ml PO Q6HR PRN 

#200 ml 10/06/20 


Metoclopramide HCl [Reglan] 10 mg PO Q6H PRN #20 tab 12/08/20 








Additional Instructions: 


PLEASE CONTACT YOUR PCP OFFICE AND GET AN ORDER FOR AN OUTPATIENT GALLBLADDER 

SONOGRAM ASAP. AVOID MARIJUANA USE, IT CAN ACTUALLY MAKE THESE SYMPTOMS WORSE.

## 2020-12-09 ENCOUNTER — HOSPITAL ENCOUNTER (OUTPATIENT)
Dept: HOSPITAL 39 - ER | Age: 25
Discharge: HOME | End: 2020-12-09
Attending: FAMILY MEDICINE
Payer: COMMERCIAL

## 2020-12-09 VITALS — TEMPERATURE: 98.1 F | DIASTOLIC BLOOD PRESSURE: 62 MMHG | SYSTOLIC BLOOD PRESSURE: 95 MMHG | OXYGEN SATURATION: 98 %

## 2020-12-09 DIAGNOSIS — Z88.5: ICD-10-CM

## 2020-12-09 DIAGNOSIS — K21.9: ICD-10-CM

## 2020-12-09 DIAGNOSIS — K81.2: Primary | ICD-10-CM

## 2020-12-09 PROCEDURE — 81001 URINALYSIS AUTO W/SCOPE: CPT

## 2020-12-09 PROCEDURE — 85025 COMPLETE CBC W/AUTO DIFF WBC: CPT

## 2020-12-09 PROCEDURE — 83605 ASSAY OF LACTIC ACID: CPT

## 2020-12-09 PROCEDURE — 80053 COMPREHEN METABOLIC PANEL: CPT

## 2020-12-09 PROCEDURE — 00790 ANES IPER UPR ABD NOS: CPT

## 2020-12-09 PROCEDURE — 47562 LAPAROSCOPIC CHOLECYSTECTOMY: CPT

## 2020-12-09 PROCEDURE — 84703 CHORIONIC GONADOTROPIN ASSAY: CPT

## 2020-12-09 PROCEDURE — 36415 COLL VENOUS BLD VENIPUNCTURE: CPT

## 2020-12-09 PROCEDURE — 87635 SARS-COV-2 COVID-19 AMP PRB: CPT

## 2020-12-09 PROCEDURE — 76775 US EXAM ABDO BACK WALL LIM: CPT

## 2020-12-09 NOTE — HP
REASON FOR ADMISSION:  Acute cholecystitis.



HISTORY OF PRESENT ILLNESS:  This is a 25-year-old woman with a history of 
intermittent abdominal pain, a known history of gastroesophageal reflux disease 
who apparently had an esophageal dilation in the past who presented with more 
severe episode of right upper quadrant pain with nausea, no vomiting, typically 
postprandial.  She does not have a history of jaundice or acholic stools.



PAST MEDICAL HISTORY:  Denies.



PAST SURGICAL HISTORY:  

1.  Dilation as above.

2.  Appendectomy.



FAMILY HISTORY:  Noncontributory.



SOCIAL HISTORY:  The patient denies any drugs, significant alcohol or tobacco 
use.



ALLERGIES:  HYDROCODONE, UNKNOWN IF CODEINE IS A PROBLEM.



PHYSICAL EXAMINATION: 



VITAL SIGNS:  Afebrile.  Vital signs are normal.  



GENERAL:  The patient is alert and oriented, in no distress, in mild pain at 
this moment.



HEENT:  Normocephalic, atraumatic.  Pupils equal and reactive.  Sclerae 
anicteric.  Oral mucosa is moist.  



NECK:  No lymphadenopathy.



CHEST:  Clear and equal bilaterally.  No wheezing or crackles.



HEART:  Regular rate and rhythm. 



ABDOMEN: Flat and soft.  Right upper quadrant tenderness.  No evidence of 
diffuse rebound.  No CVA tenderness.  



EXTREMITIES:  No cyanosis, clubbing or edema.  



NEUROLOGIC:  Sensation is grossly intact, as is all movement.  



LABORATORY:  White blood cell count 11, hematocrit 38, platelet count 236.  
Liver function tests are essentially normal as is her BMP.  Beta HCG is 
negative.  Urinalysis with no evidence of acute infection.



RADIOLOGY:  I reviewed the ultrasound films and confirmed gallstones and some 
slight gallbladder wall thickening consistent with cholecystitis.



IMPRESSION:

1.  Acute cholecystitis, otherwise healthy woman.



PLAN: She has been consented for laparoscopic versus open cholecystectomy and we
will take her to surgery today and likely will be able to be discharged 
postoperatively depending on intraoperative findings.



#[id number]

MTDD

## 2020-12-09 NOTE — US
Procedure:  US ABDOMEN LIMITED        



Exam Date:  12/9/2020



Ordering Provider:  Joe Pierce



Clinical Indication:  RECURRENT NAUSEA, R/O GB DZ



Comparison: 9/20/2009 CT abdomen



Technique: Real-time ultrasonography was obtained over the right

upper quadrant and representative images were recorded.



Findings: 

There are gallstones within the gallbladder lumen. 

There is gallbladder wall thickening and trace pericholecystic

fluid. 

The common bile duct is normal in size measuring 4 mm.

The liver is normal in size and contour. 

There is normal echogenicity throughout the liver.

There is no hepatic mass.

There is no intrahepatic ductal dilatation.

Visualized pancreas, aorta and IVC are unremarkable.

No hydronephrosis in the right kidney.

There is no ascites.



Impression: 

1. Cholelithiasis with findings suspicious for acute

cholecystitis.



Electronically signed by:  Fidel Bonilla MD  12/9/2020 10:03 AM [facility]

Workstation: [id number]

## 2020-12-09 NOTE — OP
DATE OF PROCEDURE:  12/09/20



PREOPERATIVE DIAGNOSIS: 

1.  Acute cholecystitis.



POSTOPERATIVE DIAGNOSIS: 

1.  Acute cholecystitis.



PROCEDURE: 

1.  Laparoscopic cholecystectomy.



SURGEON:  Gerry Monte MD.



ANESTHESIA:  General and local.



FINDINGS:  Routine gallbladder overall.  The duct and artery were clearly 
visualized through the triangle of Calot.



COMPLICATIONS:  None.



ESTIMATED BLOOD LOSS:  Minimal.  



SPECIMEN:  Gallbladder.



PLAN:  Discharge.



INDICATION:  This is a 25-year-old woman who presented to the Emergency 
Department with moderate history of symptoms, now with more severe symptoms 
consistent with acute cholecystitis.  Ultrasound confirmed the same.  Her labs 
were normal.  She had no contraindications for surgery.  She was consented.



PROCEDURE:  The patient was brought to the Operating Suite in supine position.  
General anesthesia was induced.  The patient was prepped and draped in sterile 
fashion.  Marcaine 0.5% with epinephrine was used at all incision sites.  While 
maintaining upward traction, a nick was made near the base of the umbilicus.  
Veress needle was introduced.  There was free flow of fluid into the peritoneal 
cavity which was insufflated to an appropriate level with CO2 gas.  The 5 mm 
trocar was placed followed by the camera.  There was no evidence of bleeding or 
bowel injury.  The patient was positioned and subxiphoid and lateral ports were 
placed.  The gallbladder fundus was grasped and retracted superiorly and 
laterally.  The infundibulum was grasped.  The infundibular structures were 
dissected free.  The duct and artery were clearly visualized through the 
triangle of Calot.  The duct was triply ligated as was the artery.  A small 
posterolateral branch was also clipped as it was oozing.  A slight amount of 
bleeding from the mid lateral fossa was controlled with cautery.  Otherwise, 
completely hemostatic.  The gallbladder was then dissected off the fossa in toto
and removed in the EndoCatch bag.  The fossa was examined.  It remained 
hemostatic under low pressure.  The clips were intact.  There was no bleeding or
bile leakage.  The subxiphoid fascia was then closed with 0 Vicryl using the 
suture passer.  It was airtight and non-bleeding.  The remaining trocars were 
removed and the abdomen was desufflated.  The wounds were closed with Monocryl. 
Dressings were applied.  The patient was awakened and taken to Recovery in 
stable condition to be discharged.



#[id number]



cc:  JAIME Rinaldi MD

[facility]

## 2021-01-08 NOTE — ED.PDOC
History of Present Illness





- General


Chief Complaint: GI Problem


Stated Complaint: n/v x3 days


Time Seen by Provider: 12/09/20 08:20


Information Source: patient


Exam Limitations: no limitations





- History of Present Illness


Initial Comments: 





PATIENT RETURNS TO ED WITH SOME COMPLAINTS AS SEEN HERE APPROXIMATELY 36 HOURS 

AGO, SHE HAS SINCE ALSO BEEN SEEN AT [facility] AND SENT HOME. 

PATIENT HAS RECURRENT DYSMENORRHEA FOR WHICH HER SISTER TREATS HER FOR. SHE 

ADMITS TO FREQUENT MARIJUANA USE. IT IS UNCLEAR IF THIS IS PART OF HER 

"TREATMENT". PATIENT'S SISTER IS THE PRIMARY SOURCE OF HISTORY AS SHE INSISTENT 

ON GIVING HISTORY FOR HER SISTER. 


Abdominal Pain Onset Location: epigastric


Pain Radiation: no radiation


Quality: mild


Timing/Duration: 1/2 hour





Review of Systems





- Review of Systems


Constitutional: States: no symptoms reported


EENTM: States: no symptoms reported


Respiratory: States: no symptoms reported


Cardiology: States: no symptoms reported


Gastrointestinal/Abdominal: States: see HPI


Genitourinary: States: no symptoms reported


Musculoskeletal: States: no symptoms reported





Past Medical History (General)





- Patient Medical History


Hx Seizures: No


Hx Stroke: No


Hx Dementia: No


Hx Asthma: No


Hx of COPD: No


Hx Cardiac Disorders: No


Hx Congestive Heart Failure: No


Hx Pacemaker: No


Hx Hypertension: No


Hx Thyroid Disease: No


Hx Diabetes: No


Hx Gastroesophageal Reflux: Yes


Hx Renal Disease: No


Hx Cancer: No


Hx of HIV: No


Hx Hepatitis C: No


Hx MRSA: No


Surgical History: appendectomy





- Vaccination History


Hx Tetanus, Diphtheria Vaccination: No


Hx Influenza Vaccination: No


Hx Pneumococcal Vaccination: No





- Social History


Hx Tobacco Use: Yes - vapes


Hx Alcohol Use: Yes - occ





- Activities of Daily Living


Hospice Agency (if applicable):: None





- Female History


Hx Last Menstrual Period: 12/07/20


Patient Pregnant: No





Family Medical History





- Family History


  ** Mother


Family History: Unknown





Physical Exam





- Physical Exam


General Appearance: Alert, Anxious, Other - SOMEWHAT EXPRESSIVE ABOUT HER 

SYMPTOMS


Neck: supple


Respiratory: chest non-tender, lungs clear, normal breath sounds


Cardiovascular/Chest: normal peripheral pulses, regular rate, rhythm, no edema, 

no gallop


Gastrointestinal/Abdominal: normal bowel sounds, non tender, soft, no 

organomegaly, no pulsatile mass


Neurologic: CNs II-XII nml as tested, no motor/sensory deficits, alert, normal 

mood/affect, oriented x 3


Skin Exam: normal color, warm/dry, cyanosis





Departure





- Departure


Clinical Impression: 


 Cyclic vomiting syndrome, Marijuana use





Time of Disposition: 10:00


Disposition: Discharge to Home or Self Care


Condition: Good


Diet: full liquid diet


Home Medications: 


Ambulatory Orders





Omeprazole 40 mg PO DAILY 10/06/20 


Promethazine Syr 6.25 mg/5 ml [Phenergan Syrup 6.25 mg/5 ml] 10 ml PO Q6HR PRN 

#200 ml 10/06/20 


Diclofenac Potassium 50 mg PO TID PRN #20 tab 12/08/20 


Metoclopramide HCl [Reglan] 10 mg PO Q6H PRN #20 tab 12/08/20

## 2024-11-01 NOTE — PROCEDURE: COUNSELING
Birth Control Pills Counseling: Birth Control Pill Counseling: I discussed with the patient the potential side effects of OCPs including but not limited to increased risk of stroke, heart attack, thrombophlebitis, deep venous thrombosis, hepatic adenomas, breast changes, GI upset, headaches, and depression.  The patient verbalized understanding of the proper use and possible adverse effects of OCPs. All of the patient's questions and concerns were addressed.
Topical Retinoid counseling:  Patient advised to apply a pea-sized amount only at bedtime and wait 30 minutes after washing their face before applying.  If too drying, patient may add a non-comedogenic moisturizer. The patient verbalized understanding of the proper use and possible adverse effects of retinoids.  All of the patient's questions and concerns were addressed.
Spironolactone Counseling: Patient advised regarding risks of diarrhea, abdominal pain, hyperkalemia, birth defects (for female patients), liver toxicity and renal toxicity. The patient may need blood work to monitor liver and kidney function and potassium levels while on therapy. The patient verbalized understanding of the proper use and possible adverse effects of spironolactone.  All of the patient's questions and concerns were addressed.
Tazorac Counseling:  Patient advised that medication is irritating and drying.  Patient may need to apply sparingly and wash off after an hour before eventually leaving it on overnight.  The patient verbalized understanding of the proper use and possible adverse effects of tazorac.  All of the patient's questions and concerns were addressed.
High Dose Vitamin A Pregnancy And Lactation Text: High dose vitamin A therapy is contraindicated during pregnancy and breast feeding.
Bactrim Counseling:  I discussed with the patient the risks of sulfa antibiotics including but not limited to GI upset, allergic reaction, drug rash, diarrhea, dizziness, photosensitivity, and yeast infections.  Rarely, more serious reactions can occur including but not limited to aplastic anemia, agranulocytosis, methemoglobinemia, blood dyscrasias, liver or kidney failure, lung infiltrates or desquamative/blistering drug rashes.
Doxycycline Counseling:  Patient counseled regarding possible photosensitivity and increased risk for sunburn.  Patient instructed to avoid sunlight, if possible.  When exposed to sunlight, patients should wear protective clothing, sunglasses, and sunscreen.  The patient was instructed to call the office immediately if the following severe adverse effects occur:  hearing changes, easy bruising/bleeding, severe headache, or vision changes.  The patient verbalized understanding of the proper use and possible adverse effects of doxycycline.  All of the patient's questions and concerns were addressed.
Topical Clindamycin Counseling: Patient counseled that this medication may cause skin irritation or allergic reactions.  In the event of skin irritation, the patient was advised to reduce the amount of the drug applied or use it less frequently.   The patient verbalized understanding of the proper use and possible adverse effects of clindamycin.  All of the patient's questions and concerns were addressed.
Detail Level: Zone
Azithromycin Counseling:  I discussed with the patient the risks of azithromycin including but not limited to GI upset, allergic reaction, drug rash, diarrhea, and yeast infections.
Dapsone Counseling: I discussed with the patient the risks of dapsone including but not limited to hemolytic anemia, agranulocytosis, rashes, methemoglobinemia, kidney failure, peripheral neuropathy, headaches, GI upset, and liver toxicity.  Patients who start dapsone require monitoring including baseline LFTs and weekly CBCs for the first month, then every month thereafter.  The patient verbalized understanding of the proper use and possible adverse effects of dapsone.  All of the patient's questions and concerns were addressed.
Minocycline Pregnancy And Lactation Text: This medication is Pregnancy Category D and not consider safe during pregnancy. It is also excreted in breast milk.
Doxycycline Pregnancy And Lactation Text: This medication is Pregnancy Category D and not consider safe during pregnancy. It is also excreted in breast milk but is considered safe for shorter treatment courses.
Isotretinoin Counseling: Patient should get monthly blood tests, not donate blood, not drive at night if vision affected, not share medication, and not undergo elective surgery for 6 months after tx completed. Side effects reviewed, pt to contact office should one occur.
Include Pregnancy/Lactation Warning?: No
Dapsone Pregnancy And Lactation Text: This medication is Pregnancy Category C and is not considered safe during pregnancy or breast feeding.
High Dose Vitamin A Counseling: Side effects reviewed, pt to contact office should one occur.
Benzoyl Peroxide Pregnancy And Lactation Text: This medication is Pregnancy Category C. It is unknown if benzoyl peroxide is excreted in breast milk.
Bactrim Pregnancy And Lactation Text: This medication is Pregnancy Category D and is known to cause fetal risk.  It is also excreted in breast milk.
Never
Topical Retinoid Pregnancy And Lactation Text: This medication is Pregnancy Category C. It is unknown if this medication is excreted in breast milk.
Benzoyl Peroxide Counseling: Patient counseled that medicine may cause skin irritation and bleach clothing.  In the event of skin irritation, the patient was advised to reduce the amount of the drug applied or use it less frequently.   The patient verbalized understanding of the proper use and possible adverse effects of benzoyl peroxide.  All of the patient's questions and concerns were addressed.
Erythromycin Counseling:  I discussed with the patient the risks of erythromycin including but not limited to GI upset, allergic reaction, drug rash, diarrhea, increase in liver enzymes, and yeast infections.
Topical Sulfur Applications Counseling: Topical Sulfur Counseling: Patient counseled that this medication may cause skin irritation or allergic reactions.  In the event of skin irritation, the patient was advised to reduce the amount of the drug applied or use it less frequently.   The patient verbalized understanding of the proper use and possible adverse effects of topical sulfur application.  All of the patient's questions and concerns were addressed.
Topical Clindamycin Pregnancy And Lactation Text: This medication is Pregnancy Category B and is considered safe during pregnancy. It is unknown if it is excreted in breast milk.
Birth Control Pills Pregnancy And Lactation Text: This medication should be avoided if pregnant and for the first 30 days post-partum.
Spironolactone Pregnancy And Lactation Text: This medication can cause feminization of the male fetus and should be avoided during pregnancy. The active metabolite is also found in breast milk.
Minocycline Counseling: Patient advised regarding possible photosensitivity and discoloration of the teeth, skin, lips, tongue and gums.  Patient instructed to avoid sunlight, if possible.  When exposed to sunlight, patients should wear protective clothing, sunglasses, and sunscreen.  The patient was instructed to call the office immediately if the following severe adverse effects occur:  hearing changes, easy bruising/bleeding, severe headache, or vision changes.  The patient verbalized understanding of the proper use and possible adverse effects of minocycline.  All of the patient's questions and concerns were addressed.
Azithromycin Pregnancy And Lactation Text: This medication is considered safe during pregnancy and is also secreted in breast milk.
Tetracycline Counseling: Patient counseled regarding possible photosensitivity and increased risk for sunburn.  Patient instructed to avoid sunlight, if possible.  When exposed to sunlight, patients should wear protective clothing, sunglasses, and sunscreen.  The patient was instructed to call the office immediately if the following severe adverse effects occur:  hearing changes, easy bruising/bleeding, severe headache, or vision changes.  The patient verbalized understanding of the proper use and possible adverse effects of tetracycline.  All of the patient's questions and concerns were addressed. Patient understands to avoid pregnancy while on therapy due to potential birth defects.
Tazorac Pregnancy And Lactation Text: This medication is not safe during pregnancy. It is unknown if this medication is excreted in breast milk.
Erythromycin Pregnancy And Lactation Text: This medication is Pregnancy Category B and is considered safe during pregnancy. It is also excreted in breast milk.
Topical Sulfur Applications Pregnancy And Lactation Text: This medication is Pregnancy Category C and has an unknown safety profile during pregnancy. It is unknown if this topical medication is excreted in breast milk.
Isotretinoin Pregnancy And Lactation Text: This medication is Pregnancy Category X and is considered extremely dangerous during pregnancy. It is unknown if it is excreted in breast milk.